# Patient Record
Sex: FEMALE | Race: BLACK OR AFRICAN AMERICAN | URBAN - METROPOLITAN AREA
[De-identification: names, ages, dates, MRNs, and addresses within clinical notes are randomized per-mention and may not be internally consistent; named-entity substitution may affect disease eponyms.]

---

## 2022-12-14 ENCOUNTER — OFFICE VISIT (OUTPATIENT)
Dept: URBAN - METROPOLITAN AREA CLINIC 30 | Facility: CLINIC | Age: 57
End: 2022-12-14
Payer: COMMERCIAL

## 2022-12-14 DIAGNOSIS — H43.813 VITREOUS DEGENERATION, BILATERAL: Primary | ICD-10-CM

## 2022-12-14 DIAGNOSIS — H04.123 DRY EYE SYNDROME OF BILATERAL LACRIMAL GLANDS: ICD-10-CM

## 2022-12-14 DIAGNOSIS — H52.13 MYOPIA, BILATERAL: ICD-10-CM

## 2022-12-14 PROCEDURE — 92134 CPTRZ OPH DX IMG PST SGM RTA: CPT

## 2022-12-14 PROCEDURE — 92004 COMPRE OPH EXAM NEW PT 1/>: CPT

## 2022-12-14 ASSESSMENT — VISUAL ACUITY
OS: 20/25
OD: 20/25

## 2022-12-14 ASSESSMENT — INTRAOCULAR PRESSURE
OS: 13
OD: 15

## 2022-12-14 ASSESSMENT — KERATOMETRY
OD: 45.91
OS: 45.70

## 2022-12-14 NOTE — IMPRESSION/PLAN
Impression: Dry eye syndrome of bilateral lacrimal glands: H04.123. Plan: Dry eyes account for the patient's complaints. There is no evidence of permanent changes to the cornea. Explained condition does not have a cure and will need artificial tears for maintenance. Current gtts: OTC AT's prn Relief of symptoms? some Patient instructed to use artificial tears (Systane or Refresh) 4-6x/daily. Explained it may take time for eyes to acclimate completely to OTC gtt regimen.